# Patient Record
Sex: FEMALE | Race: WHITE | NOT HISPANIC OR LATINO
[De-identification: names, ages, dates, MRNs, and addresses within clinical notes are randomized per-mention and may not be internally consistent; named-entity substitution may affect disease eponyms.]

---

## 2020-09-17 ENCOUNTER — RESULT REVIEW (OUTPATIENT)
Age: 70
End: 2020-09-17

## 2021-07-20 ENCOUNTER — APPOINTMENT (OUTPATIENT)
Dept: OTOLARYNGOLOGY | Facility: CLINIC | Age: 71
End: 2021-07-20
Payer: MEDICARE

## 2021-07-20 DIAGNOSIS — Z80.1 FAMILY HISTORY OF MALIGNANT NEOPLASM OF TRACHEA, BRONCHUS AND LUNG: ICD-10-CM

## 2021-07-20 DIAGNOSIS — Z82.49 FAMILY HISTORY OF ISCHEMIC HEART DISEASE AND OTHER DISEASES OF THE CIRCULATORY SYSTEM: ICD-10-CM

## 2021-07-20 DIAGNOSIS — Z86.79 PERSONAL HISTORY OF OTHER DISEASES OF THE CIRCULATORY SYSTEM: ICD-10-CM

## 2021-07-20 DIAGNOSIS — R04.2 HEMOPTYSIS: ICD-10-CM

## 2021-07-20 PROCEDURE — 99204 OFFICE O/P NEW MOD 45 MIN: CPT | Mod: 25

## 2021-07-20 PROCEDURE — 31575 DIAGNOSTIC LARYNGOSCOPY: CPT

## 2021-07-20 RX ORDER — ENALAPRIL MALEATE 10 MG/1
10 TABLET ORAL
Refills: 0 | Status: ACTIVE | COMMUNITY

## 2021-07-20 RX ORDER — ALPRAZOLAM 1 MG/1
1 TABLET ORAL
Refills: 0 | Status: ACTIVE | COMMUNITY

## 2021-07-20 RX ORDER — ATENOLOL 50 MG/1
50 TABLET ORAL
Refills: 0 | Status: ACTIVE | COMMUNITY

## 2021-07-20 NOTE — HISTORY OF PRESENT ILLNESS
[de-identified] : SHELLY HSU is a 70 year woman with a history of cirrhosis and HTN. She has several episodes  of coughing up a small amount of darkish red blood recently.

## 2021-07-20 NOTE — REVIEW OF SYSTEMS
[Post Nasal Drip] : post nasal drip [Patient Intake Form Reviewed] : Patient intake form was reviewed [de-identified] : coughing up small amount of blood

## 2021-07-20 NOTE — REASON FOR VISIT
[Initial Evaluation] : an initial evaluation for [FreeTextEntry2] : c/o periodically coughing up small amount of blood

## 2021-07-20 NOTE — ASSESSMENT
[FreeTextEntry1] : SHELLY HSU has had several episodes of bringing up darkish blood. Her exam is normal.

## 2021-07-20 NOTE — CONSULT LETTER
[Dear  ___] : Dear ~LEEANNE, [Consult Letter:] : I had the pleasure of evaluating your patient, [unfilled]. [Please see my note below.] : Please see my note below. [Consult Closing:] : Thank you very much for allowing me to participate in the care of this patient.  If you have any questions, please do not hesitate to contact me. [Sincerely,] : Sincerely, [FreeTextEntry3] : Ashok Whalen MD\par

## 2021-08-13 ENCOUNTER — APPOINTMENT (OUTPATIENT)
Dept: OTOLARYNGOLOGY | Facility: CLINIC | Age: 71
End: 2021-08-13
Payer: MEDICARE

## 2021-08-13 VITALS
OXYGEN SATURATION: 99 % | WEIGHT: 139 LBS | HEIGHT: 60 IN | DIASTOLIC BLOOD PRESSURE: 71 MMHG | TEMPERATURE: 98.2 F | SYSTOLIC BLOOD PRESSURE: 109 MMHG | BODY MASS INDEX: 27.29 KG/M2 | HEART RATE: 64 BPM

## 2021-08-13 DIAGNOSIS — Z87.891 PERSONAL HISTORY OF NICOTINE DEPENDENCE: ICD-10-CM

## 2021-08-13 DIAGNOSIS — Z87.19 PERSONAL HISTORY OF OTHER DISEASES OF THE DIGESTIVE SYSTEM: ICD-10-CM

## 2021-08-13 DIAGNOSIS — Z82.49 FAMILY HISTORY OF ISCHEMIC HEART DISEASE AND OTHER DISEASES OF THE CIRCULATORY SYSTEM: ICD-10-CM

## 2021-08-13 DIAGNOSIS — E04.1 NONTOXIC SINGLE THYROID NODULE: ICD-10-CM

## 2021-08-13 DIAGNOSIS — Z82.3 FAMILY HISTORY OF STROKE: ICD-10-CM

## 2021-08-13 DIAGNOSIS — I27.21 SECONDARY PULMONARY ARTERIAL HYPERTENSION: ICD-10-CM

## 2021-08-13 PROCEDURE — 99215 OFFICE O/P EST HI 40 MIN: CPT | Mod: 25

## 2021-08-13 PROCEDURE — 10005 FNA BX W/US GDN 1ST LES: CPT

## 2021-08-13 PROCEDURE — 31575 DIAGNOSTIC LARYNGOSCOPY: CPT

## 2021-08-13 NOTE — CONSULT LETTER
[Dear  ___] : Dear  [unfilled], [Consult Letter:] : I had the pleasure of evaluating your patient, [unfilled]. [Please see my note below.] : Please see my note below. [Consult Closing:] : Thank you very much for allowing me to participate in the care of this patient.  If you have any questions, please do not hesitate to contact me. [Sincerely,] : Sincerely, [DrAbilio  ___] : Dr. LIMA [FreeTextEntry3] : \par Baljeet Agarwal M.D., FACS, ECNU\par Director Center for Thyroid & Parathyroid Surgery\par [facility]\par Certified in Thyroid/Parathyroid/Neck Ultrasound, ECNU/ AIUM\par \par , Department of Otolaryngology\par [facility] [employer] [facility] at [facility]\par

## 2021-08-13 NOTE — REASON FOR VISIT
[FreeTextEntry2] : a consultation concerning a 2.8 cm solid right mid thyroid lobe nodule possible FNA biopsy. [FreeTextEntry1] : Patient referred by Edgar Husain MD Endocrinology, PCP is Antonia Nguyễn MD

## 2021-08-13 NOTE — HISTORY OF PRESENT ILLNESS
[de-identified] : Elizabeth is a generally healthy 70-year-old female who had blood tinged sputum ~ 3 weeks ago associated with morning throat clearing and mild intermittent hoarseness and seen by ENT (Dr. CARVALHO) and no obvious source. She had GERD in the past improved with diet changes.  An upper GI endoscopy had been performed last year demonstrating esophagitis and gastritis with a hiatal hernia.  She was negative for TB and was found on a chest CT to have multiple  thyroid nodules She was found to have cirrhosis of her liver on abdominal MRI..  A chest x-ray was clear.  There was no pulmonary lesion.  The bleeding was transient over a few days.  Has a history of cauterization of her nasal septum for epistaxis on several occasions and has had some scant blood from the left side of her nose yesterday.  She is euthyroid.  Elizabeth denies recent shortness of breath, voice changes, dysphagia, anterior neck pain, neck pressure or mass. There is no family history of thyroid cancer.  She denies any known radiation exposures in her youth. She denies fever, body aches, cough, cyanosis, chest burning, anosmia or recent known COVID exposures.  All family members at home are well.  She had the J and J COVID-19 vaccine and had a rash and swelling of her palms ankles with pruritus..

## 2021-09-09 ENCOUNTER — APPOINTMENT (OUTPATIENT)
Dept: OTOLARYNGOLOGY | Facility: CLINIC | Age: 71
End: 2021-09-09

## 2021-11-02 ENCOUNTER — APPOINTMENT (OUTPATIENT)
Dept: ULTRASOUND IMAGING | Facility: HOSPITAL | Age: 71
End: 2021-11-02
Payer: MEDICARE

## 2021-11-02 ENCOUNTER — OUTPATIENT (OUTPATIENT)
Dept: OUTPATIENT SERVICES | Facility: HOSPITAL | Age: 71
LOS: 1 days | End: 2021-11-02
Payer: MEDICARE

## 2021-11-02 PROCEDURE — 76700 US EXAM ABDOM COMPLETE: CPT

## 2021-11-02 PROCEDURE — 76700 US EXAM ABDOM COMPLETE: CPT | Mod: 26

## 2022-02-14 ENCOUNTER — APPOINTMENT (OUTPATIENT)
Dept: OTOLARYNGOLOGY | Facility: CLINIC | Age: 72
End: 2022-02-14

## 2022-03-01 ENCOUNTER — OUTPATIENT (OUTPATIENT)
Dept: OUTPATIENT SERVICES | Facility: HOSPITAL | Age: 72
LOS: 1 days | End: 2022-03-01

## 2022-03-01 ENCOUNTER — APPOINTMENT (OUTPATIENT)
Dept: ULTRASOUND IMAGING | Facility: CLINIC | Age: 72
End: 2022-03-01
Payer: MEDICARE

## 2022-03-01 PROCEDURE — 76700 US EXAM ABDOM COMPLETE: CPT | Mod: 26

## 2022-03-21 ENCOUNTER — APPOINTMENT (OUTPATIENT)
Dept: OTOLARYNGOLOGY | Facility: CLINIC | Age: 72
End: 2022-03-21
Payer: MEDICARE

## 2022-03-21 VITALS
BODY MASS INDEX: 27.29 KG/M2 | DIASTOLIC BLOOD PRESSURE: 81 MMHG | HEIGHT: 60 IN | WEIGHT: 139 LBS | SYSTOLIC BLOOD PRESSURE: 130 MMHG | OXYGEN SATURATION: 99 % | HEART RATE: 78 BPM | TEMPERATURE: 98.2 F

## 2022-03-21 PROCEDURE — 31575 DIAGNOSTIC LARYNGOSCOPY: CPT

## 2022-03-21 PROCEDURE — 99215 OFFICE O/P EST HI 40 MIN: CPT | Mod: 25

## 2022-03-21 PROCEDURE — 10005 FNA BX W/US GDN 1ST LES: CPT

## 2022-03-21 NOTE — CONSULT LETTER
[Dear  ___] : Dear  [unfilled], [Consult Letter:] : I had the pleasure of evaluating your patient, [unfilled]. [Please see my note below.] : Please see my note below. [Consult Closing:] : Thank you very much for allowing me to participate in the care of this patient.  If you have any questions, please do not hesitate to contact me. [Sincerely,] : Sincerely, [DrAbilio  ___] : Dr. LIMA [FreeTextEntry3] : \par Baljeet Agarwal M.D., FACS, ECNU\par Director Center for Thyroid & Parathyroid Surgery\par [facility] [place] [facility]\par Certified in Thyroid/Parathyroid/Neck Ultrasound, ECNU/ AIUM\par \par , Department of Otolaryngology\par [facility] [employer] [facility] at [facility]\par

## 2022-03-21 NOTE — HISTORY OF PRESENT ILLNESS
[de-identified] : Elizabeth is a generally healthy 70-year-old female who had blood tinged sputum ~ 3 weeks ago associated with morning throat clearing and mild intermittent hoarseness and seen by ENT (Dr. CARVALHO) and no obvious source. She had GERD in the past improved with diet changes.  An upper GI endoscopy had been performed last year demonstrating esophagitis and gastritis with a hiatal hernia.  She was negative for TB and was found on a chest CT to have multiple  thyroid nodules She was found to have cirrhosis of her liver on abdominal MRI..  A chest x-ray was clear.  There was no pulmonary lesion.  The bleeding was transient over a few days.  Has a history of cauterization of her nasal septum for epistaxis on several occasions and has had some scant blood from the left side of her nose yesterday.  She is euthyroid.  Elizabeth denies recent shortness of breath, voice changes, dysphagia, anterior neck pain, neck pressure or mass. There is no family history of thyroid cancer.  She denies any known radiation exposures in her youth. She denies fever, body aches, cough, cyanosis, chest burning, anosmia or recent known COVID exposures.  All family members at home are well.  She had the J and J COVID-19 vaccine and had a rash and swelling of her palms ankles with pruritus.\par  [FreeTextEntry1] : Elizabeth returns for a f/u visit concerning an FNA biopsy cytology result and Afirma GSC/Xpression [place] reported as AUS [place] category III. However, the Afirma genomic sequencing  was suspicious with a 50% risk of malignancy and on the Xpression [place] was found to have a KRAS mutation. The possibilities for this nodule include several benign and malignant neoplasms such as adenoma, NIFTP, follicular variant of papillary thyroid carcinoma and follicular thyroid carcinoma. I advised her to either consider a repeat FNA  or thyroidectomy.  She returns now as she has requested another FNA biopsy as she wants to avoid thyroidectomy.

## 2022-03-21 NOTE — REASON FOR VISIT
[FreeTextEntry2] : a consultation concerning a 2.8 cm solid right mid thyroid lobe nodule for possible FNA biopsy. [FreeTextEntry1] : Patient referred by Edgar Pacheco MD Endocrinology, PCP is Antonia Nguyễn MD

## 2022-03-21 NOTE — PROCEDURE
[Image(s) Captured] : image(s) captured and filed [Unable to Cooperate with Mirror] : patient unable to cooperate with mirror [Gag Reflex] : gag reflex preventing mirror examination [Hoarseness] : hoarseness not clearly evaluated by indirect laryngoscopy [Topical Lidocaine] : topical lidocaine [Oxymetazoline HCl] : oxymetazoline HCl [Flexible Endoscope] : examined with the flexible endoscope [Serial Number: ___] : Serial Number: [unfilled] [FreeTextEntry3] : \par ..........................................[facility]\par ........................................ULTRASOUND-GUIDED THYROID FINE NEEDLE ASPIRATION BIOPSY REPORT\par \par NAME: SHELLY ROJAS.........MR# [id number] .........: 1950 .........DATE: 2022 .........TIME: 2:45 PM.\par \par HISTORY/ INDICATIONS: 71- year-old female with a solid right mid lobe hypoechoic nodule and adjacent calcifications.\par \par EXAM: Real-time high-resolution ultrasound imaging of the thyroid gland was performed in the longitudinal and transverse planes with color power Doppler supplementation and image capture.\par \par FINDINGS: A right mid lobe nodule measuring 2.51 x 1.62 x 1.64 (previously 2.50 x 1.35 x 1.85 cm) (longitudinal x AP x transverse) was identified and targeted for USG-FNA.  The nodule had the following ultrasonographic characteristics: solid, hypoechoic, heterogeneous, smoothly marginated, few punctate echogenic foci, grade 3 vascularity on color Doppler, and wider-than-tall, TI-RADS 4.\par \par PROCEDURE: A time out took place and documented for correct patient identifiers, site and side of procedure.  After obtaining informed consent with discussion of risks, benefits and alternatives, the patient was positioned semi-supine, the skin was prepped with alcohol and 0.5 cc of 1% Plain  Lidocaine was injected for local anesthesia. A #25-gauge needle was then passed along the perpendicular plane of the transducer, positioned within the nodule and confirmed with ultrasonography.  Multiple aspirations were made within the nodule with four separate needle punctures, four passes each.  Aspirates were smeared on glass slides and also directly placed into both formalin and cytolyt solutions for cytologic analysis, immunohistochemistry, and possible molecular genomic diagnostics.  The patient tolerated the procedure well without complications and was discharged with signed post-procedure instructions indicating the importance of following up on all results. \par \par ASSESSMENT & PLAN: Successful USG-FNA of a right mid lobe solid hypoechoic nodule was well tolerated without complications. The patient will be contacted to review the cytologic findings as soon as available for further treatment planning.  A discussion took place with the patient who accepted the responsibility to call the office to review the cytology results if no communication occurs within two weeks. Follow-up imaging in 1 year is recommended if the cytology is reported as benign, [place] Category II.\par \par Electronically signed by COOKIE SANABRIA M.D., FACS on 2022, 3:00PM.\par \par [facility]: [address], [address],  [place] [address]\par [phone] (voice), [phone] (fax) [de-identified] : The nasal septum is minimally deviated to the left with a spur. No blood seen. There are no masses or polyps and the nasal mucosa and secretions are normal. The choanae and posterior nasopharynx are normal without masses or drainage. The Eustachian tube orifices appear patent. The pharynx, including the posterior and lateral pharyngeal walls, the vallecula and base of tongue are normal without ulcerations, lesions or masses. The hypopharynx including the pyriform sinuses open well without pooling of secretions, mucosal lesions or masses. The supraglottic larynx including the epiglottis, petiole, arytenoids, glossoepiglottic, aryepiglottic and pharyngoepiglottic folds are normal without mucosal lesions, ulcerations or masses. The glottis reveals normal false vocal folds. The true vocal folds are glistening white, tense and of equal length, without paralysis, having symmetric mobility on adduction and abduction. There are no mucosal lesions, nodules, cysts, erythroplasia or leukoplakia. The posterior cricoid area has healthy pink mucosa in the interarytenoid area and esophageal inlet. There is minimal thickening/edema of the interarytenoid mucosa suggestive of posterior laryngitis from laryngopharyngeal acid reflux disease. The trachea is clear without narrowing in the immediate subglottic region, without deviation or lesions.  [de-identified] : thyroid nodule

## 2022-04-25 ENCOUNTER — APPOINTMENT (OUTPATIENT)
Dept: OTOLARYNGOLOGY | Facility: CLINIC | Age: 72
End: 2022-04-25
Payer: MEDICARE

## 2022-04-25 VITALS
TEMPERATURE: 98.1 F | HEART RATE: 71 BPM | SYSTOLIC BLOOD PRESSURE: 146 MMHG | DIASTOLIC BLOOD PRESSURE: 77 MMHG | WEIGHT: 149 LBS | HEIGHT: 61 IN | OXYGEN SATURATION: 99 % | BODY MASS INDEX: 28.13 KG/M2

## 2022-04-25 DIAGNOSIS — R22.1 LOCALIZED SWELLING, MASS AND LUMP, NECK: ICD-10-CM

## 2022-04-25 PROCEDURE — 99215 OFFICE O/P EST HI 40 MIN: CPT | Mod: 25

## 2022-04-25 PROCEDURE — 31575 DIAGNOSTIC LARYNGOSCOPY: CPT

## 2022-04-25 RX ORDER — OMEPRAZOLE 40 MG/1
40 CAPSULE, DELAYED RELEASE ORAL
Qty: 30 | Refills: 0 | Status: ACTIVE | COMMUNITY
Start: 2021-11-18

## 2022-04-25 RX ORDER — AZITHROMYCIN 250 MG/1
250 TABLET, FILM COATED ORAL
Qty: 6 | Refills: 0 | Status: ACTIVE | COMMUNITY
Start: 2022-01-13

## 2022-04-25 RX ORDER — ROSUVASTATIN CALCIUM 5 MG/1
5 TABLET, FILM COATED ORAL
Qty: 30 | Refills: 0 | Status: ACTIVE | COMMUNITY
Start: 2022-01-19

## 2022-04-25 RX ORDER — NITROGLYCERIN 0.4 MG/1
0.4 TABLET SUBLINGUAL
Qty: 25 | Refills: 0 | Status: ACTIVE | COMMUNITY
Start: 2021-08-21

## 2022-04-25 RX ORDER — ATORVASTATIN CALCIUM 10 MG/1
10 TABLET, FILM COATED ORAL
Qty: 30 | Refills: 0 | Status: ACTIVE | COMMUNITY
Start: 2021-08-20

## 2022-04-25 RX ORDER — KETOCONAZOLE 20 MG/G
2 CREAM TOPICAL
Qty: 60 | Refills: 0 | Status: ACTIVE | COMMUNITY
Start: 2021-04-28

## 2022-04-25 RX ORDER — AMOXICILLIN 500 MG/1
500 TABLET, FILM COATED ORAL
Qty: 14 | Refills: 0 | Status: ACTIVE | COMMUNITY
Start: 2022-01-11

## 2022-04-25 NOTE — PROCEDURE
[Image(s) Captured] : image(s) captured and filed [Unable to Cooperate with Mirror] : patient unable to cooperate with mirror [Gag Reflex] : gag reflex preventing mirror examination [Hoarseness] : hoarseness not clearly evaluated by indirect laryngoscopy [Topical Lidocaine] : topical lidocaine [Oxymetazoline HCl] : oxymetazoline HCl [Flexible Endoscope] : examined with the flexible endoscope [Serial Number: ___] : Serial Number: [unfilled] [de-identified] : The nasal septum is minimally deviated to the left with a spur. No blood seen. There are no masses or polyps and the nasal mucosa and secretions are normal. The choanae and posterior nasopharynx are normal without masses or drainage. The Eustachian tube orifices appear patent. The pharynx, including the posterior and lateral pharyngeal walls, the vallecula and base of tongue are normal without ulcerations, lesions or masses. The hypopharynx including the pyriform sinuses open well without pooling of secretions, mucosal lesions or masses. The supraglottic larynx including the epiglottis, petiole, arytenoids, glossoepiglottic, aryepiglottic and pharyngoepiglottic folds are normal without mucosal lesions, ulcerations or masses. The glottis reveals normal false vocal folds. The true vocal folds are glistening white, tense and of equal length, without paralysis, having symmetric mobility on adduction and abduction. There are no mucosal lesions, nodules, cysts, erythroplasia or leukoplakia. The posterior cricoid area has healthy pink mucosa in the interarytenoid area and esophageal inlet. There is mild thickening/edema of the interarytenoid mucosa suggestive of posterior laryngitis from laryngopharyngeal acid reflux disease. The trachea is clear without narrowing in the immediate subglottic region, without deviation or lesions.  [de-identified] : thyroid nodule suspicious for neoplasm s/p recent FNAB

## 2022-04-25 NOTE — HISTORY OF PRESENT ILLNESS
[de-identified] : Elizabeth is a generally healthy 70-year-old female who had blood tinged sputum ~ 3 weeks ago associated with morning throat clearing and mild intermittent hoarseness and seen by ENT (Dr. CARVALHO) and no obvious source. She had GERD in the past improved with diet changes.  An upper GI endoscopy had been performed last year demonstrating esophagitis and gastritis with a hiatal hernia.  She was negative for TB and was found on a chest CT to have multiple  thyroid nodules She was found to have cirrhosis of her liver on abdominal MRI..  A chest x-ray was clear.  There was no pulmonary lesion.  The bleeding was transient over a few days.  Has a history of cauterization of her nasal septum for epistaxis on several occasions and has had some scant blood from the left side of her nose yesterday.  She is euthyroid.  Elizabeth denies recent shortness of breath, voice changes, dysphagia, anterior neck pain, neck pressure or mass. There is no family history of thyroid cancer.  She denies any known radiation exposures in her youth. She denies fever, body aches, cough, cyanosis, chest burning, anosmia or recent known COVID exposures.  All family members at home are well.  She had the J and J COVID-19 vaccine and had a rash and swelling of her palms ankles with pruritus.\par  [FreeTextEntry1] : Elizabeth returns for a f/u visit concerning an FNA biopsy cytology result and Afirma GSC/Xpression [place] reported as AUS [place] category III. However, the Afirma genomic sequencing  was suspicious with a 50% risk of malignancy and on the Xpression [place] was found to have a KRAS mutation. The possibilities for this nodule include several benign and malignant neoplasms such as adenoma, NIFTP, follicular variant of papillary thyroid carcinoma and follicular thyroid carcinoma. A repeat FNA  was done and again reported as AUS/FLUS [place] category III and on ThyroSeq mutation genotyping has a KRAS mutation copy number alterations.  This molecular profile is associated with a 50% probability of a follicular pattern cancer or NIFTP and the remaining nodules benign clonal follicular adenomas.  She returns now to discuss the results and consider a thyroidectomy.

## 2022-04-25 NOTE — CONSULT LETTER
[Dear  ___] : Dear  [unfilled], [Consult Letter:] : I had the pleasure of evaluating your patient, [unfilled]. [Please see my note below.] : Please see my note below. [Consult Closing:] : Thank you very much for allowing me to participate in the care of this patient.  If you have any questions, please do not hesitate to contact me. [Sincerely,] : Sincerely, [DrAbilio  ___] : Dr. LIMA [FreeTextEntry3] : \par Baljeet Agarwal M.D., FACS, ECNU\par Director Center for Thyroid & Parathyroid Surgery\par [facility] [place] at [facility]\par Certified in Thyroid/Parathyroid/Neck Ultrasound, ECNU/ AIUM\par \par , Department of Otolaryngology\par [facility] at [facility]\par

## 2022-04-29 ENCOUNTER — OUTPATIENT (OUTPATIENT)
Dept: OUTPATIENT SERVICES | Facility: HOSPITAL | Age: 72
LOS: 1 days | End: 2022-04-29
Payer: MEDICARE

## 2022-04-29 DIAGNOSIS — D44.0 NEOPLASM OF UNCERTAIN BEHAVIOR OF THYROID GLAND: ICD-10-CM

## 2022-05-03 ENCOUNTER — RESULT REVIEW (OUTPATIENT)
Age: 72
End: 2022-05-03

## 2022-05-03 PROCEDURE — 88321 CONSLTJ&REPRT SLD PREP ELSWR: CPT

## 2022-05-04 LAB — NON-GYNECOLOGICAL CYTOLOGY STUDY: SIGNIFICANT CHANGE UP

## 2022-05-23 ENCOUNTER — NON-APPOINTMENT (OUTPATIENT)
Age: 72
End: 2022-05-23

## 2022-06-08 ENCOUNTER — APPOINTMENT (OUTPATIENT)
Dept: OTOLARYNGOLOGY | Facility: HOSPITAL | Age: 72
End: 2022-06-08

## 2023-02-24 ENCOUNTER — TRANSCRIPTION ENCOUNTER (OUTPATIENT)
Age: 73
End: 2023-02-24

## 2023-03-23 ENCOUNTER — APPOINTMENT (OUTPATIENT)
Dept: ORTHOPEDIC SURGERY | Facility: CLINIC | Age: 73
End: 2023-03-23
Payer: MEDICARE

## 2023-03-23 VITALS — HEIGHT: 61 IN | WEIGHT: 139 LBS | BODY MASS INDEX: 26.24 KG/M2

## 2023-03-23 PROCEDURE — 99204 OFFICE O/P NEW MOD 45 MIN: CPT | Mod: 25

## 2023-03-23 PROCEDURE — 20610 DRAIN/INJ JOINT/BURSA W/O US: CPT | Mod: RT

## 2023-03-30 ENCOUNTER — NON-APPOINTMENT (OUTPATIENT)
Age: 73
End: 2023-03-30

## 2023-04-13 ENCOUNTER — APPOINTMENT (OUTPATIENT)
Dept: ORTHOPEDIC SURGERY | Facility: CLINIC | Age: 73
End: 2023-04-13

## 2023-04-17 ENCOUNTER — APPOINTMENT (OUTPATIENT)
Dept: OTOLARYNGOLOGY | Facility: CLINIC | Age: 73
End: 2023-04-17
Payer: MEDICARE

## 2023-04-17 VITALS
WEIGHT: 139 LBS | BODY MASS INDEX: 26.24 KG/M2 | HEIGHT: 61 IN | TEMPERATURE: 98 F | DIASTOLIC BLOOD PRESSURE: 76 MMHG | OXYGEN SATURATION: 99 % | SYSTOLIC BLOOD PRESSURE: 135 MMHG | HEART RATE: 70 BPM

## 2023-04-17 DIAGNOSIS — H90.3 SENSORINEURAL HEARING LOSS, BILATERAL: ICD-10-CM

## 2023-04-17 DIAGNOSIS — H93.13 TINNITUS, BILATERAL: ICD-10-CM

## 2023-04-17 PROCEDURE — 92504 EAR MICROSCOPY EXAMINATION: CPT

## 2023-04-17 PROCEDURE — 92557 COMPREHENSIVE HEARING TEST: CPT

## 2023-04-17 PROCEDURE — 92550 TYMPANOMETRY & REFLEX THRESH: CPT | Mod: 52

## 2023-04-17 PROCEDURE — 99214 OFFICE O/P EST MOD 30 MIN: CPT | Mod: 25

## 2023-04-17 NOTE — ASSESSMENT
[FreeTextEntry1] : We discussed her asymmetry & will screen w/ MR; rpt audio 6 months sooner prn. Discussed general tinnitus treatment including masking, amplification when appropriate, alternative treatments, helpful smartphone applications, CBT and OTC medications.\par \par She will follow up w/ Dr. Agarwal regarding her thyroid.

## 2023-04-17 NOTE — HISTORY OF PRESENT ILLNESS
[de-identified] : Hissing sound in her ears; she has to ask people to rpt themselves at times. Denies: vertigo, ear pain, drainage, frequent loud noise exp/shooting, frequent infections, hx of ear surgery or IV chemo; denies a FHx of hearing loss. Qtip use: yes\par Known thyroid nodule w/ pos Afirma & Thyroseq w/ Dr. Agarwal in 2022; she subsequently saw someone at [facility] who told her they could watch it. No trouble swallowing & no hoarseness. The patient denies a family history of thyroid disorders/cancers and has had no ionizing radiation exposure.\par

## 2023-04-17 NOTE — PHYSICAL EXAM
[Binocular Microscopic Exam] : Binocular microscopic exam was performed [Laryngoscopy Performed] : laryngoscopy was performed, see procedure section for findings [Normal] : no masses and lesions seen, face is symmetric

## 2023-04-17 NOTE — DATA REVIEWED
[de-identified] : today: nl to severe SNHL AU w/ asymmetry (R worse);  AU\par - Immitance testing w/ type A AU\par  [de-identified] : 5/22: FNA R mid BC III\par 4/22: Thyroseq w/ pos KRAS (50% malig)\par 8/21: BC III, Afirma KRAS pos [de-identified] : 3/22 (clinic sono): R mid 2.5 x 1.6 x 1.6 TI-RADS 4\par

## 2023-04-20 ENCOUNTER — APPOINTMENT (OUTPATIENT)
Dept: ORTHOPEDIC SURGERY | Facility: CLINIC | Age: 73
End: 2023-04-20
Payer: MEDICARE

## 2023-04-20 DIAGNOSIS — M75.41 IMPINGEMENT SYNDROME OF RIGHT SHOULDER: ICD-10-CM

## 2023-04-20 PROCEDURE — 99212 OFFICE O/P EST SF 10 MIN: CPT

## 2023-04-20 RX ORDER — ROSUVASTATIN CALCIUM 5 MG/1
5 TABLET, FILM COATED ORAL
Refills: 0 | Status: ACTIVE | COMMUNITY

## 2023-04-20 RX ORDER — ASPIRIN 81 MG
81 TABLET, DELAYED RELEASE (ENTERIC COATED) ORAL
Refills: 0 | Status: ACTIVE | COMMUNITY

## 2023-04-25 ENCOUNTER — APPOINTMENT (OUTPATIENT)
Dept: MRI IMAGING | Facility: CLINIC | Age: 73
End: 2023-04-25
Payer: MEDICARE

## 2023-04-25 ENCOUNTER — OUTPATIENT (OUTPATIENT)
Dept: OUTPATIENT SERVICES | Facility: HOSPITAL | Age: 73
LOS: 1 days | End: 2023-04-25

## 2023-04-25 PROCEDURE — 70553 MRI BRAIN STEM W/O & W/DYE: CPT | Mod: 26,MH

## 2023-05-11 ENCOUNTER — APPOINTMENT (OUTPATIENT)
Dept: OTOLARYNGOLOGY | Facility: CLINIC | Age: 73
End: 2023-05-11
Payer: MEDICARE

## 2023-05-11 VITALS
SYSTOLIC BLOOD PRESSURE: 135 MMHG | WEIGHT: 139 LBS | HEART RATE: 67 BPM | DIASTOLIC BLOOD PRESSURE: 82 MMHG | HEIGHT: 61 IN | OXYGEN SATURATION: 100 % | TEMPERATURE: 97.7 F | BODY MASS INDEX: 26.24 KG/M2

## 2023-05-11 DIAGNOSIS — D49.7 NEOPLASM OF UNSPECIFIED BEHAVIOR OF ENDOCRINE GLANDS AND OTHER PARTS OF NERVOUS SYSTEM: ICD-10-CM

## 2023-05-11 DIAGNOSIS — K21.9 GASTRO-ESOPHAGEAL REFLUX DISEASE W/OUT ESOPHAGITIS: ICD-10-CM

## 2023-05-11 DIAGNOSIS — D44.0 NEOPLASM OF UNCERTAIN BEHAVIOR OF THYROID GLAND: ICD-10-CM

## 2023-05-11 PROCEDURE — 99215 OFFICE O/P EST HI 40 MIN: CPT | Mod: 25

## 2023-05-11 PROCEDURE — 31575 DIAGNOSTIC LARYNGOSCOPY: CPT

## 2023-05-11 PROCEDURE — 76536 US EXAM OF HEAD AND NECK: CPT

## 2023-05-11 NOTE — CONSULT LETTER
[Dear  ___] : Dear  [unfilled], [Consult Letter:] : I had the pleasure of evaluating your patient, [unfilled]. [Please see my note below.] : Please see my note below. [Consult Closing:] : Thank you very much for allowing me to participate in the care of this patient.  If you have any questions, please do not hesitate to contact me. [Sincerely,] : Sincerely, [DrAbilio  ___] : Dr. LIMA [FreeTextEntry3] : \par Baljeet Agarwal M.D., FACS, ECNU\par Director Center for Thyroid & Parathyroid Surgery\par [facility] [place] [facility]\par Certified in Thyroid/Parathyroid/Neck Ultrasound, ECNU/ AIUM\par \par , Department of Otolaryngology\par [facility] at [facility]\par

## 2023-05-11 NOTE — HISTORY OF PRESENT ILLNESS
[de-identified] : Elizabeth is a generally healthy 70-year-old female who had blood tinged sputum ~ 3 weeks ago associated with morning throat clearing and mild intermittent hoarseness and seen by ENT (Dr. CARVALHO) and no obvious source. She had GERD in the past improved with diet changes.  An upper GI endoscopy had been performed last year demonstrating esophagitis and gastritis with a hiatal hernia.  She was negative for TB and was found on a chest CT to have multiple  thyroid nodules She was found to have cirrhosis of her liver on abdominal MRI..  A chest x-ray was clear.  There was no pulmonary lesion.  The bleeding was transient over a few days.  Has a history of cauterization of her nasal septum for epistaxis on several occasions and has had some scant blood from the left side of her nose yesterday.  She is euthyroid.  Elizabeth denies recent shortness of breath, voice changes, dysphagia, anterior neck pain, neck pressure or mass. There is no family history of thyroid cancer.  She denies any known radiation exposures in her youth. She denies fever, body aches, cough, cyanosis, chest burning, anosmia or recent known COVID exposures.  All family members at home are well.  She had the J and J COVID-19 vaccine and had a rash and swelling of her palms ankles with pruritus.\par  [FreeTextEntry1] : Elizabeth returns for a f/u visit concerning an FNA biopsy cytology result and Afirma GSC/Xpression [place] reported as AUS [place] category III. However, the irma genomic sequencing  was suspicious with a 50% risk of malignancy and on the Xpression [place] was found to have a KRAS mutation. The possibilities for this nodule include several benign and malignant neoplasms such as adenoma, NIFTP, follicular variant of papillary thyroid carcinoma and follicular thyroid carcinoma. A repeat FNA  was done and again reported as AUS/FLUS [place] category III and on ThyroSeq mutation profiling has a KRAS mutation with copy number alterations.  This molecular profile is associated with a 50% probability of a follicular pattern cancer or NIFTP and the remaining nodules benign clonal follicular adenomas.  Since her last visit she had two US exams at [facility] that have been stable. The last exam was in October.  She was also offered thyroidectomy there but she has elected active surveillance.  She saw Dr. Carlos Mukherjee.  Her weight is generally stable. She had a borderline elevated TSH in March at 4.440 but the repeat normal at 3.39. Her free T4 is normal. She has morning hoarseness but the voice clears during the day. She denies any new neck masses, pain, dysphagia or voice changes. She denies fever, body aches, cough, cyanosis, chest burning, anosmia or recent known COVID exposures.  All family members at home are well.She is vaccinated.  She had a mild COVID infection in January 2021. She has intermittent epistaxis bilaterally and sneezing.  She was never allergy tested but her nose if often dry.

## 2023-05-11 NOTE — REASON FOR VISIT
[FreeTextEntry2] : a consultation concerning a 2.8 cm solid right mid thyroid lobe nodule, FNA [place] III with KRAS and copy # alterations on molecular profiling. [FreeTextEntry1] : Patient referred by Edgar Pacheco MD Endocrinology, PCP is Antonia Nguyễn MD

## 2023-05-11 NOTE — PROCEDURE
[Image(s) Captured] : image(s) captured and filed [Unable to Cooperate with Mirror] : patient unable to cooperate with mirror [Gag Reflex] : gag reflex preventing mirror examination [Hoarseness] : hoarseness not clearly evaluated by indirect laryngoscopy [Topical Lidocaine] : topical lidocaine [Oxymetazoline HCl] : oxymetazoline HCl [Flexible Endoscope] : examined with the flexible endoscope [Serial Number: ___] : Serial Number: [unfilled] [de-identified] : The nasal septum is minimally deviated to the left with a spur. No blood seen. There are no masses or polyps and the nasal mucosa and secretions are normal. The [place]e and posterior nasopharynx are normal without masses or drainage. The Eustachian tube orifices appear patent. The pharynx, including the posterior and lateral pharyngeal walls, the vallecula and base of tongue are normal without ulcerations, lesions or masses. The hypopharynx including the pyriform sinuses open well without pooling of secretions, mucosal lesions or masses. The supraglottic larynx including the epiglottis, petiole, arytenoids, glossoepiglottic, aryepiglottic and pharyngoepiglottic folds are normal without mucosal lesions, ulcerations or masses. The glottis reveals normal false vocal folds. The true vocal folds are glistening white, tense and of equal length, without paralysis, having symmetric mobility on adduction and abduction. There are no mucosal lesions, nodules, cysts, erythroplasia or leukoplakia. The posterior cricoid area has healthy pink mucosa in the interarytenoid area and esophageal inlet. There is minimal thickening/edema of the interarytenoid mucosa suggestive of posterior laryngitis from laryngopharyngeal acid reflux disease. The trachea is clear without narrowing in the immediate subglottic region, without deviation or lesions.  [de-identified] : thyroid nodule suspicious for neoplasm s/p recent FNAB

## 2023-05-11 NOTE — DATA REVIEWED
[de-identified] : See HPI [de-identified] : See HPI [de-identified] : see HPI [de-identified] : Note from [facility] reviewed

## 2023-05-13 ENCOUNTER — OUTPATIENT (OUTPATIENT)
Dept: OUTPATIENT SERVICES | Facility: HOSPITAL | Age: 73
LOS: 1 days | End: 2023-05-13

## 2023-05-13 ENCOUNTER — APPOINTMENT (OUTPATIENT)
Dept: ULTRASOUND IMAGING | Facility: CLINIC | Age: 73
End: 2023-05-13
Payer: MEDICARE

## 2023-05-13 ENCOUNTER — RESULT REVIEW (OUTPATIENT)
Age: 73
End: 2023-05-13

## 2023-05-13 PROCEDURE — 76700 US EXAM ABDOM COMPLETE: CPT | Mod: 26

## 2023-06-07 ENCOUNTER — APPOINTMENT (OUTPATIENT)
Dept: ORTHOPEDIC SURGERY | Facility: CLINIC | Age: 73
End: 2023-06-07
Payer: MEDICARE

## 2023-06-07 VITALS
DIASTOLIC BLOOD PRESSURE: 80 MMHG | OXYGEN SATURATION: 99 % | BODY MASS INDEX: 26.24 KG/M2 | HEIGHT: 61 IN | SYSTOLIC BLOOD PRESSURE: 162 MMHG | HEART RATE: 72 BPM | WEIGHT: 139 LBS

## 2023-06-07 DIAGNOSIS — M47.814 SPONDYLOSIS W/OUT MYELOPATHY OR RADICULOPATHY, THORACIC REGION: ICD-10-CM

## 2023-06-07 PROCEDURE — 72082 X-RAY EXAM ENTIRE SPI 2/3 VW: CPT

## 2023-06-07 PROCEDURE — 99215 OFFICE O/P EST HI 40 MIN: CPT

## 2023-06-08 PROBLEM — M47.814 DJD (DEGENERATIVE JOINT DISEASE), THORACIC: Status: ACTIVE | Noted: 2023-06-08

## 2023-06-08 NOTE — HISTORY OF PRESENT ILLNESS
[de-identified] : 72-year-old new patient presents for evaluation of incidental finding on thoracic spine CT scan.  She describes a multiyear history of occasional thoracolumbar stiffness and pain without clear injury.  This typically bothers him when she is bending moving or twisting or having a long day of physical activity.  This reliably improves with relative rest and occasionally oral anti-inflammatories.  Denies any pain at night.  Denies any radicular pain numbness weakness tingling in the extremities.  States that this pain has not been any worse recently.\par \par Undergoing work-up for a thyroid nodule at [place].  2 biopsies thus far have resulted indeterminate pathology.  Serial ultrasounds have not demonstrated any growth or concerning features.  Given history of a relative she was concerned about possible metastatic disease should this thyroid nodule be malignant.  This prompted her request for CT scan of the thoracic spine which she presents for review today

## 2023-06-08 NOTE — ASSESSMENT
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

## 2023-06-08 NOTE — DISCUSSION/SUMMARY
[de-identified] : I explained to the patient that I feel that the characteristics of the sclerosis noted on the CT scan are extremely consistent with those anticipated with degenerative spondylosis and disc disease that has progressed somewhat over the last 2 years.  I do not feel that this represents any potential metastatic condition related to her thyroid, particularly as these would much more commonly represent lytic lesions as opposed to blastic or sclerotic.  Additionally she clinically does not have any signs or symptoms of thoracic metastatic disease.  We discussed the potential utility of an MRI scan to confirm the degenerative nature of the sclerosis however have discussed that she should finalize work-up of the thyroid nodule currently planned to undergo a third serial ultrasound.  If this remains consistent with a benign nodule I do not feel that any additional imaging of the spine is necessary.  She is agreement this plan all questions were answered.\par \par I have spent greater than 60 minutes preparing to see the patient, collecting relevant history, performing a thorough history and physical examination, counseling the patient regarding my findings ordering the appropriate therapies and tests, communicating with other relevant healthcare professionals, documenting my encounter and coordinating care.\par

## 2023-06-08 NOTE — PHYSICAL EXAM
[UE/LE] : Sensory: Intact in bilateral upper & lower extremities [Normal Touch] : sensation intact for touch [Normal Proprioception] : sensation intact for proprioception [Normal] : No costovertebral angle tenderness and no spinal tenderness [de-identified] : AP lateral scoliosis x-rays 6/7/2023 Ortho PACS:\par Global coronal and sagittal balance is maintained.  Some evidence of degenerative disc disease throughout the thoracic and lumbar spine.  No evidence of instability.\par \par CT scan thoracic spine 5/16/2023 compared to CT thoracic spine July 2021\par There has been small interval progression of some focal endplate sclerosis in the setting of anterior osteophyte formation and degenerative disc disease at T12-L1 as well as T6-T7.  No lytic lesions atypical sclerosis or blastic lesions noted

## 2023-09-21 ENCOUNTER — APPOINTMENT (OUTPATIENT)
Dept: OTOLARYNGOLOGY | Facility: CLINIC | Age: 73
End: 2023-09-21

## 2024-04-18 ENCOUNTER — APPOINTMENT (OUTPATIENT)
Dept: ORTHOPEDIC SURGERY | Facility: CLINIC | Age: 74
End: 2024-04-18
Payer: MEDICARE

## 2024-04-18 VITALS — RESPIRATION RATE: 16 BRPM | HEIGHT: 61 IN | WEIGHT: 155 LBS | BODY MASS INDEX: 29.27 KG/M2

## 2024-04-18 PROCEDURE — 76882 US LMTD JT/FCL EVL NVASC XTR: CPT | Mod: LT

## 2024-04-18 PROCEDURE — 73130 X-RAY EXAM OF HAND: CPT | Mod: LT

## 2024-04-18 PROCEDURE — 99204 OFFICE O/P NEW MOD 45 MIN: CPT

## 2024-04-18 NOTE — PHYSICAL EXAM
[de-identified] : Patient with a healed laceration over the dorsal aspect of the DIP joint to the eponychial fold and around the ulnar border.  Palpable foreign body no infection [de-identified] : PA lateral oblique x-ray of the left small finger only demonstrate soft tissue swelling.  Ultrasound demonstrates a foreign body dorsal to the DIP joint

## 2024-04-18 NOTE — HISTORY OF PRESENT ILLNESS
[Right] : right hand dominant [FreeTextEntry1] : Patient presents for evaluation of her chronic left dorsal DIP joint foreign body from a broken wine bottle from 2/20/2024.  Patient went to bed through serial emergency room where after x-rays were performed there was a possible foreign body, laceration was sutured, patient was given tetanus injection.  Patient then follow-up with Dr. Santamaria.  She is here for possible foreign body removal.

## 2024-04-18 NOTE — ASSESSMENT
[FreeTextEntry1] : Patient has a retained foreign body.  It is subcutaneous.  Recommend she consider left small finger foreign body excision.  Will order ultrasound to ensure there is no additional small shards around the finger

## 2024-04-29 ENCOUNTER — APPOINTMENT (OUTPATIENT)
Dept: ORTHOPEDIC SURGERY | Facility: CLINIC | Age: 74
End: 2024-04-29
Payer: MEDICARE

## 2024-04-29 DIAGNOSIS — S60.459A SUPERFICIAL FOREIGN BODY OF UNSPECIFIED FINGER, INITIAL ENCOUNTER: ICD-10-CM

## 2024-04-29 PROCEDURE — 99442: CPT | Mod: 93

## 2024-05-29 ENCOUNTER — RESULT REVIEW (OUTPATIENT)
Age: 74
End: 2024-05-29

## 2024-05-29 ENCOUNTER — APPOINTMENT (OUTPATIENT)
Dept: MRI IMAGING | Facility: CLINIC | Age: 74
End: 2024-05-29
Payer: MEDICARE

## 2024-05-29 ENCOUNTER — OUTPATIENT (OUTPATIENT)
Dept: OUTPATIENT SERVICES | Facility: HOSPITAL | Age: 74
LOS: 1 days | End: 2024-05-29

## 2024-05-29 PROCEDURE — 74183 MRI ABD W/O CNTR FLWD CNTR: CPT | Mod: 26,MH

## 2024-12-17 ENCOUNTER — OUTPATIENT (OUTPATIENT)
Dept: OUTPATIENT SERVICES | Facility: HOSPITAL | Age: 74
LOS: 1 days | End: 2024-12-17

## 2024-12-17 ENCOUNTER — RESULT REVIEW (OUTPATIENT)
Age: 74
End: 2024-12-17

## 2024-12-17 ENCOUNTER — APPOINTMENT (OUTPATIENT)
Dept: MRI IMAGING | Facility: CLINIC | Age: 74
End: 2024-12-17
Payer: MEDICARE

## 2024-12-17 PROCEDURE — 74183 MRI ABD W/O CNTR FLWD CNTR: CPT | Mod: 26,MH

## 2025-06-24 ENCOUNTER — RESULT REVIEW (OUTPATIENT)
Age: 75
End: 2025-06-24

## 2025-06-24 ENCOUNTER — APPOINTMENT (OUTPATIENT)
Dept: ULTRASOUND IMAGING | Facility: CLINIC | Age: 75
End: 2025-06-24
Payer: MEDICARE

## 2025-06-24 ENCOUNTER — OUTPATIENT (OUTPATIENT)
Dept: OUTPATIENT SERVICES | Facility: HOSPITAL | Age: 75
LOS: 1 days | End: 2025-06-24

## 2025-06-24 PROCEDURE — 76700 US EXAM ABDOM COMPLETE: CPT | Mod: 26
